# Patient Record
Sex: MALE | Race: ASIAN | NOT HISPANIC OR LATINO
[De-identification: names, ages, dates, MRNs, and addresses within clinical notes are randomized per-mention and may not be internally consistent; named-entity substitution may affect disease eponyms.]

---

## 2018-07-26 ENCOUNTER — APPOINTMENT (OUTPATIENT)
Dept: CARDIOLOGY | Facility: CLINIC | Age: 68
End: 2018-07-26
Payer: MEDICAID

## 2018-07-26 VITALS
HEART RATE: 73 BPM | DIASTOLIC BLOOD PRESSURE: 97 MMHG | OXYGEN SATURATION: 96 % | SYSTOLIC BLOOD PRESSURE: 175 MMHG | TEMPERATURE: 97.5 F | HEIGHT: 63.39 IN | RESPIRATION RATE: 17 BRPM | WEIGHT: 136 LBS | BODY MASS INDEX: 23.8 KG/M2

## 2018-07-26 DIAGNOSIS — I72.8 ANEURYSM OF OTHER SPECIFIED ARTERIES: ICD-10-CM

## 2018-07-26 DIAGNOSIS — Z82.49 FAMILY HISTORY OF ISCHEMIC HEART DISEASE AND OTHER DISEASES OF THE CIRCULATORY SYSTEM: ICD-10-CM

## 2018-07-26 PROBLEM — Z00.00 ENCOUNTER FOR PREVENTIVE HEALTH EXAMINATION: Status: ACTIVE | Noted: 2018-07-26

## 2018-07-26 PROCEDURE — 99204 OFFICE O/P NEW MOD 45 MIN: CPT

## 2018-07-26 PROCEDURE — 93306 TTE W/DOPPLER COMPLETE: CPT

## 2018-07-26 RX ORDER — AMLODIPINE BESYLATE 10 MG/1
10 TABLET ORAL DAILY
Qty: 90 | Refills: 3 | Status: ACTIVE | COMMUNITY
Start: 2018-07-26 | End: 1900-01-01

## 2018-08-08 ENCOUNTER — APPOINTMENT (OUTPATIENT)
Dept: CARDIOLOGY | Facility: CLINIC | Age: 68
End: 2018-08-08
Payer: MEDICAID

## 2018-08-08 DIAGNOSIS — R07.89 OTHER CHEST PAIN: ICD-10-CM

## 2018-08-08 PROCEDURE — 93015 CV STRESS TEST SUPVJ I&R: CPT

## 2018-08-14 PROBLEM — I72.8 SPLENIC ARTERY ANEURYSM: Status: RESOLVED | Noted: 2018-08-14 | Resolved: 2018-08-14

## 2024-03-11 ENCOUNTER — APPOINTMENT (OUTPATIENT)
Dept: CARDIOLOGY | Facility: CLINIC | Age: 74
End: 2024-03-11
Payer: MEDICAID

## 2024-03-11 VITALS
TEMPERATURE: 96.8 F | WEIGHT: 135 LBS | BODY MASS INDEX: 23.62 KG/M2 | RESPIRATION RATE: 16 BRPM | SYSTOLIC BLOOD PRESSURE: 180 MMHG | DIASTOLIC BLOOD PRESSURE: 96 MMHG | HEART RATE: 69 BPM | OXYGEN SATURATION: 94 %

## 2024-03-11 DIAGNOSIS — I10 ESSENTIAL (PRIMARY) HYPERTENSION: ICD-10-CM

## 2024-03-11 DIAGNOSIS — R06.02 SHORTNESS OF BREATH: ICD-10-CM

## 2024-03-11 PROCEDURE — 93306 TTE W/DOPPLER COMPLETE: CPT

## 2024-03-11 PROCEDURE — 99214 OFFICE O/P EST MOD 30 MIN: CPT

## 2024-03-11 RX ORDER — ATORVASTATIN CALCIUM 20 MG/1
20 TABLET, FILM COATED ORAL
Refills: 0 | Status: ACTIVE | COMMUNITY

## 2024-03-11 RX ORDER — ASPIRIN 81 MG
81 TABLET, DELAYED RELEASE (ENTERIC COATED) ORAL
Refills: 0 | Status: ACTIVE | COMMUNITY

## 2024-03-11 RX ORDER — METOPROLOL SUCCINATE 50 MG/1
50 TABLET, EXTENDED RELEASE ORAL
Refills: 0 | Status: ACTIVE | COMMUNITY

## 2024-03-11 RX ORDER — METOPROLOL SUCCINATE 25 MG/1
25 TABLET, EXTENDED RELEASE ORAL DAILY
Qty: 30 | Refills: 5 | Status: DISCONTINUED | COMMUNITY
Start: 2018-08-08 | End: 2024-03-11

## 2024-03-11 RX ORDER — LOSARTAN POTASSIUM 100 MG/1
100 TABLET, FILM COATED ORAL DAILY
Qty: 90 | Refills: 3 | Status: DISCONTINUED | COMMUNITY
Start: 1900-01-01 | End: 2024-03-11

## 2024-03-11 RX ORDER — LOSARTAN POTASSIUM AND HYDROCHLOROTHIAZIDE 12.5; 1 MG/1; MG/1
100-12.5 TABLET ORAL DAILY
Qty: 90 | Refills: 3 | Status: ACTIVE | COMMUNITY
Start: 1900-01-01 | End: 1900-01-01

## 2024-03-11 NOTE — HISTORY OF PRESENT ILLNESS
[FreeTextEntry1] : 3/11/24 -  Please refer to NP note for HPI.  Pt reports elevated BP for 3-4 years. Pt had been in [place] for 3-4 years and was taking Losartan 100mg QD and Lercanidipine 10mg QD, BP was around 140/90. He came back to [place] for 1 month and recently changed medications as Amlodipine 10mg, Losartan-HCTZ 50-12.5mg, and Metoprolol 50mg, with BP around 150/90. Pt reports PEREZ occasionally. Patient denies CP, palpitations, or lightheadedness. Patient denies h/o syncope.  Pt is on ASA, Amlodipine 10mg, Losartan-HCTZ 50-12.5mg, and Metoprolol ER 50mg, Atorvastatin 20mg, Vascepa. Today's /96 P 69, Repeat /91 P 69.  Exam unremarkable.  ECG showed NSR with LVH.  I advised patient to undergo an echocardiogram.  I advised patient to increase Losartan HCTZ to 100-12.5 mg QD.   7/26/18 - Patient reports that for the past 1 year he has been experiencing episodes of lower chest discomfort worse with exertion.  Patient reports SOB with exertion.  Patient denies palpitations.  Patient denies history of syncope.  He is on Losartan 50 mg and Lercanidipine 10 mg (from [place]) for HTN.  Patient underwent an echocardiogram and it showed normal LV function, mild LVH, without significant valvular pathology. Patient underwent a treadmill stress test and completed 6 minutes of Davey protocol. There were upsloping ST depressions on ECG but no symptoms. Patient appears to be at low risk for having significant CAD.  I advised patient to switch Lercanidipine 10 mg to Amlodipine 10 mg since the drug is not available in [place]. He should continue Losartan 50 mg. He was noted to have hypertensive response when he returned for treadmill stress test so I advised him to add Metoprolol ER 25 mg.

## 2024-03-11 NOTE — PHYSICAL EXAM
[General Appearance - Well Developed] : well developed [Normal Appearance] : normal appearance [General Appearance - Well Nourished] : well nourished [Well Groomed] : well groomed [No Deformities] : no deformities [General Appearance - In No Acute Distress] : no acute distress [Normal Conjunctiva] : the conjunctiva exhibited no abnormalities [Eyelids - No Xanthelasma] : the eyelids demonstrated no xanthelasmas [No Oral Pallor] : no oral pallor [Normal Oral Mucosa] : normal oral mucosa [No Oral Cyanosis] : no oral cyanosis [Normal Jugular Venous A Waves Present] : normal jugular venous A waves present [Normal Jugular Venous V Waves Present] : normal jugular venous V waves present [No Jugular Venous Solano A Waves] : no jugular venous solano A waves [Heart Rate And Rhythm] : heart rate and rhythm were normal [Arterial Pulses Normal] : the arterial pulses were normal [Murmurs] : no murmurs present [Heart Sounds] : normal S1 and S2 [Edema] : no peripheral edema present [Exaggerated Use Of Accessory Muscles For Inspiration] : no accessory muscle use [Respiration, Rhythm And Depth] : normal respiratory rhythm and effort [Auscultation Breath Sounds / Voice Sounds] : lungs were clear to auscultation bilaterally [Abdomen Soft] : soft [Abdomen Tenderness] : non-tender [Abdomen Mass (___ Cm)] : no abdominal mass palpated [Abnormal Walk] : normal gait [Gait - Sufficient For Exercise Testing] : the gait was sufficient for exercise testing [Nail Clubbing] : no clubbing of the fingernails [Cyanosis, Localized] : no localized cyanosis [Petechial Hemorrhages (___cm)] : no petechial hemorrhages [] : no ischemic changes [Oriented To Time, Place, And Person] : oriented to person, place, and time [Mood] : the mood was normal [Affect] : the affect was normal [No Anxiety] : not feeling anxious

## 2024-03-11 NOTE — DISCUSSION/SUMMARY
[FreeTextEntry1] : 67-year-old male with HTN presents for evaluation of chest pain.  Patient reports that for the past 1 year he has been experiencing episodes of lower chest discomfort worse with exertion.  Patient reports SOB with exertion.  Patient denies palpitations.  Patient denies history of syncope.  He is on Losartan 50 mg and Lercanidipine 10 mg (from [place]) for HTN.\par  \par  (1) Chest discomfort with exertion - Patient underwent an echocardiogram and it showed normal LV function, mild LVH, without significant valvular pathology. Patient underwent a treadmill stress test and completed 6 minutes of Davey protocol.  There were upsloping ST depressions on ECG but no symptoms.  Patient appears to be at low risk for having significant CAD. \par  \par  (2) HTN - I advised patient to switch Lercanidipine 10 mg to Amlodipine 10 mg since the drug is not available in [place].  He should continue Losartan 50 mg.  He was noted to have hypertensive response when he returned for treadmill stress test so I advised him to add Metoprolol ER 25 mg.\par  \par  (3) Followup - 6 months.\par

## 2024-03-11 NOTE — REASON FOR VISIT
[FreeTextEntry1] : 73-year-old male with HTN presents for followup.    Patient was last seen on 7/26/18 for evaluation of chest pain.